# Patient Record
Sex: FEMALE | Race: BLACK OR AFRICAN AMERICAN | NOT HISPANIC OR LATINO | Employment: UNEMPLOYED | URBAN - METROPOLITAN AREA
[De-identification: names, ages, dates, MRNs, and addresses within clinical notes are randomized per-mention and may not be internally consistent; named-entity substitution may affect disease eponyms.]

---

## 2023-09-14 DIAGNOSIS — Z3A.28 28 WEEKS GESTATION OF PREGNANCY: Primary | ICD-10-CM

## 2023-09-14 DIAGNOSIS — Z34.93 PRENATAL CARE IN THIRD TRIMESTER: ICD-10-CM

## 2023-09-25 ENCOUNTER — TELEPHONE (OUTPATIENT)
Dept: OBSTETRICS AND GYNECOLOGY | Facility: CLINIC | Age: 28
End: 2023-09-25

## 2023-09-25 ENCOUNTER — INITIAL PRENATAL (OUTPATIENT)
Dept: OBSTETRICS AND GYNECOLOGY | Facility: CLINIC | Age: 28
End: 2023-09-25

## 2023-09-25 ENCOUNTER — DOCUMENTATION (OUTPATIENT)
Dept: OBSTETRICS AND GYNECOLOGY | Facility: CLINIC | Age: 28
End: 2023-09-25

## 2023-09-25 VITALS
BODY MASS INDEX: 22.11 KG/M2 | HEIGHT: 72 IN | WEIGHT: 163.2 LBS | SYSTOLIC BLOOD PRESSURE: 110 MMHG | DIASTOLIC BLOOD PRESSURE: 50 MMHG

## 2023-09-25 DIAGNOSIS — Z3A.28 28 WEEKS GESTATION OF PREGNANCY: ICD-10-CM

## 2023-09-25 DIAGNOSIS — Z87.410 HISTORY OF CERVICAL DYSPLASIA: ICD-10-CM

## 2023-09-25 DIAGNOSIS — O09.32 PRENATAL CARE INSUFFICIENT, SECOND TRIMESTER: Primary | ICD-10-CM

## 2023-09-25 DIAGNOSIS — O35.09X0 SINGLETON PREGNANCY COMPLICATED BY CEREBRAL VENTRICULOMEGALY OF FETUS: ICD-10-CM

## 2023-09-25 PROBLEM — Z34.90 PREGNANCY: Status: ACTIVE | Noted: 2023-09-25

## 2023-09-25 LAB
EXPIRATION DATE: 0
GLUCOSE UR STRIP-MCNC: NEGATIVE MG/DL
Lab: 0
PROT UR STRIP-MCNC: NEGATIVE MG/DL

## 2023-09-25 RX ORDER — PRENATAL VIT NO.126/IRON/FOLIC 28MG-0.8MG
TABLET ORAL DAILY
COMMUNITY

## 2023-09-25 NOTE — PROGRESS NOTES
Faxed signed medical record request to [facility] via RightFax; confirmed it went through.

## 2023-09-25 NOTE — ASSESSMENT & PLAN NOTE
Reviewed borderline/mild ventriculomegaly of the third ventricle.  TORCH labs ordered.  Referral to PDC.  Overall, no other suspicious findings.

## 2023-09-25 NOTE — PROGRESS NOTES
Initial ob visit     CC- Here for care of pregnancy        Evangelist Youngblood is a 28 y.o. female, , who presents for her first obstetrical visit.  Her last LMP was No LMP recorded. Patient is pregnant.. Her MILY is 2023, by Ultrasound. Current GA is 28w1d.     Initial positive test date : 2023 unsure of date, UPT        Her periods are: every 4 weeks  Prior obstetric issues: none  Patient's past medical history is significant for:  None .  Family history of genetic issues (includes FOB): None  Prior infections concerning in pregnancy (Rash, fever in last 2 weeks): No  Varicella Hx - vaccinated  Prior testing for Cystic Fibrosis Carrier or Sickle Cell Trait- None  Prepregnancy BMI - Body mass index is 21.53 kg/m².  History of STD: no  Hx of HSV for patient or partner: no  Ultrasound Today: yes, borderline/mild dilatation of third ventricle. Suboptimal spine views    OB History    Para Term  AB Living   3 1 1 0 1 1   SAB IAB Ectopic Molar Multiple Live Births   1 0 0 0 0 1      # Outcome Date GA Lbr Percy/2nd Weight Sex Delivery Anes PTL Lv   3 Current            2 SAB  13w0d    SAB      1 Term      Vag-Spont   STARR       Additional Pertinent History   Last Pap : Last pap in CT, unknown date  Result: unknown  HPV: unknown  [place] with bx      Last Completed Pap Smear       This patient has no relevant Health Maintenance data.          History of abnormal Pap smear: no  Family history of uterine, colon, breast, or ovarian cancer: no  Feelings of Anxiety or Depression: no  Tobacco Usage?: No   Alcohol/Drug Use?: NO  Over the age of 35 at delivery: no  Desires Genetic Screening: NIPT Only  [facility] Status[facility]    Current Outpatient Medications:     prenatal vitamin (prenatal, CLASSIC, vitamin) tablet, Take  by mouth Daily., Disp: , Rfl:      Past Medical History:   Diagnosis Date    Miscarriage at 13 weeks gestation 2021        History reviewed. No pertinent surgical  "history.    Review of Systems   Review of Systems    Patient Reports:  occasional sharp pain in right hip that makes it difficult to walk at times  , it does not radiate to other areas  Patient Denies: Nausea, Nausea and vomiting, Spotting, Heavy bleeding, Cramping, and Fatigue  All systems reviewed and otherwise normal.    I have reviewed and agree with the HPI, ROS, and historical information as entered above. Kris Amaro MD     /50   Ht 185.4 cm (73\")   Wt 74 kg (163 lb 3.2 oz)   BMI 21.53 kg/m²     The additional following portions of the patient's history were reviewed and updated as appropriate: allergies, current medications, past family history, past medical history, past social history, past surgical history, and problem list.    Physical Exam  General:  well developed; well nourished  no acute distress       Chest/Respiratory: No labored breathing, normal respiratory effort, normal appearance, no respiratory noises noted   Heart:  normal rate, regular rhythm,  no murmurs, rubs, or gallops   Thyroid: not examined   Breasts:  Not performed.   Abdomen: soft, non-tender; no masses  no umbilical or inguinal hernias are present  no hepato-splenomegaly   Pelvis: External genitalia:  normal appearance of the external genitalia including Bartholin's and [place]'s glands.  Vaginal:  normal pink mucosa without prolapse or lesions.  Cervix:  normal appearance.  Uterus:  normal size, shape and consistency.  Adnexa: normal findings        Assessment and Plan    Problem List Items Addressed This Visit          Genitourinary and Reproductive     History of cervical dysplasia    Overview     Previously told she needed colposcopy, never performed.         Current Assessment & Plan     Pap performed today.            Gravid and     Pregnancy    Overview     [ ] Initial PN Labs: Normal / Abnormal (Blood Type/ Rubella)   [ ] Aneuploidy Screening: Normal / Abnormal  [ ] Anatomy Ultrasound: Normal/ Abnormal "   [ ] 24-28 week labs: Normal / Abnormal  [ ] Flu Shot: Given / Declined / Not Applicable   [x] TDAP: Given 2023   [ ] Rhogam: Given / Declined / Not Applicable   [ ] GBS: Positive / Negative  [ ] 36 week GC/CT: Positive / Negative              Current Assessment & Plan     New OB labs today, along with NIPT.  1 hour Glucola today.         Relevant Orders    Obstetric Panel    Urine Culture - Urine, Urine, Clean Catch    Urine Drug Screen - Urine, Clean Catch    Chlamydia trachomatis, Neisseria gonorrhoeae, PCR w/ confirmation - Urine, Urine, Clean Catch    Varicella Zoster Antibody, IgG    HIV-1 / O / 2 Ag / Antibody    TSH Rfx On Abnormal To Free T4    Gestational Screen 1 Hr ([facility])    [facility] [place]    LIQUID-BASED PAP SMEAR, [facility] ([facility])    CMV IgG IgM    Toxoplasma Antibodies IgG / IgM    Parvovirus B19 Antibody, IgG & IgM    POC Protein, Urine, Qualitative, Dipstick (Completed)    POC Glucose, Urine, Qualitative, Dipstick (Completed)    [id number] PLUS Core+SCA+ESS - Blood,    Hemoglobinopathy Fractionation Dare    Tdap Vaccine => 8yo IM (BOOSTRIX) (Completed)    [place] pregnancy complicated by cerebral ventriculomegaly of fetus    Current Assessment & Plan     Reviewed borderline/mild ventriculomegaly of the third ventricle.  TORCH labs ordered.  Referral to PDC.  Overall, no other suspicious findings.         Relevant Orders    [facility] [place]    CMV IgG IgM    Toxoplasma Antibodies IgG / IgM    Parvovirus B19 Antibody, IgG & IgM     Other Visit Diagnoses       Prenatal care insufficient, second trimester    -  Primary            Pregnancy at 28w1d  Reviewed routine prenatal care with the office and educational materials given  Lab(s) Ordered  Discussed options for genetic testing including first trimester nuchal translucency screen, genetic disease carrier testing, quadruple screen, and NIPT  Nausea/Vomiting - she does not desire medications at  this time.  Discussed conservative ways to help with nausea.  Patient is on Prenatal vitamins  Referral to PDC Ordered  Return in about 4 weeks (around 10/23/2023) for Prenatal Care.      Kris Amaro MD  09/25/2023

## 2023-09-25 NOTE — TELEPHONE ENCOUNTER
Attempted to retrieve lab results from patient's ER visit at 18w4d. Medical record request was sent 9/21, but no records received by this office yet. [facility] patient was never seen by their office. This RN referred to [facility] at [phone]. Spoke with Dia. She checked medical record requests and did not see a request in the system for this patient. Fax number given by Dia [phone].

## 2023-09-26 LAB
ABO GROUP BLD: ABNORMAL
AMPHETAMINES UR QL SCN: NEGATIVE NG/ML
BARBITURATES UR QL SCN: NEGATIVE NG/ML
BASOPHILS # BLD AUTO: 0 X10E3/UL (ref 0–0.2)
BASOPHILS NFR BLD AUTO: 0 %
BENZODIAZ UR QL SCN: NEGATIVE NG/ML
BLD GP AB SCN SERPL QL: NEGATIVE
BZE UR QL SCN: NEGATIVE NG/ML
CANNABINOIDS UR QL SCN: NEGATIVE NG/ML
CMV IGG SERPL IA-ACNC: 3.2 U/ML (ref 0–0.59)
CMV IGM SERPL IA-ACNC: <30 AU/ML (ref 0–29.9)
CREAT UR-MCNC: 149.8 MG/DL (ref 20–300)
EOSINOPHIL # BLD AUTO: 0.3 X10E3/UL (ref 0–0.4)
EOSINOPHIL NFR BLD AUTO: 3 %
ERYTHROCYTE [DISTWIDTH] IN BLOOD BY AUTOMATED COUNT: 12.1 % (ref 11.7–15.4)
HBV SURFACE AG SERPL QL IA: NEGATIVE
HCT VFR BLD AUTO: 30.8 % (ref 34–46.6)
HCV IGG SERPL QL IA: NON REACTIVE
HGB A MFR BLD ELPH: 97.4 % (ref 96.4–98.8)
HGB A2 MFR BLD ELPH: 2.6 % (ref 1.8–3.2)
HGB BLD-MCNC: 10.7 G/DL (ref 11.1–15.9)
HGB F MFR BLD ELPH: 0 % (ref 0–2)
HGB FRACT BLD-IMP: NORMAL
HGB S MFR BLD ELPH: 0 %
HIV 1+2 AB+HIV1 P24 AG SERPL QL IA: NON REACTIVE
IMM GRANULOCYTES # BLD AUTO: 0 X10E3/UL (ref 0–0.1)
IMM GRANULOCYTES NFR BLD AUTO: 0 %
LABORATORY COMMENT REPORT: ABNORMAL
LABORATORY COMMENT REPORT: NORMAL
LYMPHOCYTES # BLD AUTO: 2 X10E3/UL (ref 0.7–3.1)
LYMPHOCYTES NFR BLD AUTO: 26 %
MCH RBC QN AUTO: 31.9 PG (ref 26.6–33)
MCHC RBC AUTO-ENTMCNC: 34.7 G/DL (ref 31.5–35.7)
MCV RBC AUTO: 92 FL (ref 79–97)
METHADONE UR QL SCN: NEGATIVE NG/ML
MONOCYTES # BLD AUTO: 0.6 X10E3/UL (ref 0.1–0.9)
MONOCYTES NFR BLD AUTO: 8 %
NEUTROPHILS # BLD AUTO: 4.8 X10E3/UL (ref 1.4–7)
NEUTROPHILS NFR BLD AUTO: 63 %
OPIATES UR QL SCN: NEGATIVE NG/ML
OXYCODONE+OXYMORPHONE UR QL SCN: NEGATIVE NG/ML
PCP UR QL: NEGATIVE NG/ML
PH UR: 6.3 [PH] (ref 4.5–8.9)
PLATELET # BLD AUTO: 256 X10E3/UL (ref 150–450)
PROPOXYPH UR QL SCN: NEGATIVE NG/ML
RBC # BLD AUTO: 3.35 X10E6/UL (ref 3.77–5.28)
RH BLD: POSITIVE
RPR SER QL: NON REACTIVE
RUBV IGG SERPL IA-ACNC: 10.5 INDEX
T GONDII IGG SERPL IA-ACNC: 18.8 IU/ML (ref 0–7.1)
T GONDII IGM SER IA-ACNC: <3 AU/ML (ref 0–7.9)
TSH SERPL DL<=0.005 MIU/L-ACNC: 1.39 UIU/ML (ref 0.45–4.5)
VZV IGG SER IA-ACNC: 1041 INDEX
WBC # BLD AUTO: 7.7 X10E3/UL (ref 3.4–10.8)

## 2023-09-27 LAB
BACTERIA UR CULT: NO GROWTH
BACTERIA UR CULT: NORMAL
C TRACH RRNA SPEC QL NAA+PROBE: NEGATIVE
GLUCOSE 1H P 50 G GLC PO SERPL-MCNC: 62 MG/DL (ref 70–139)
N GONORRHOEA RRNA SPEC QL NAA+PROBE: NEGATIVE
REF LAB TEST METHOD: NORMAL

## 2023-09-28 LAB
B19V IGG SER IA-ACNC: 0.2 INDEX (ref 0–0.8)
B19V IGM SER IA-ACNC: 0.1 INDEX (ref 0–0.8)
REF LAB TEST METHOD: NORMAL

## 2023-09-29 ENCOUNTER — TELEPHONE (OUTPATIENT)
Dept: OBSTETRICS AND GYNECOLOGY | Facility: CLINIC | Age: 28
End: 2023-09-29

## 2023-10-17 ENCOUNTER — OFFICE VISIT (OUTPATIENT)
Dept: OBSTETRICS AND GYNECOLOGY | Facility: HOSPITAL | Age: 28
End: 2023-10-17
Payer: MEDICAID

## 2023-10-17 ENCOUNTER — HOSPITAL ENCOUNTER (OUTPATIENT)
Dept: WOMENS IMAGING | Facility: HOSPITAL | Age: 28
Discharge: HOME | End: 2023-10-17
Admitting: OBSTETRICS & GYNECOLOGY
Payer: MEDICAID

## 2023-10-17 VITALS — DIASTOLIC BLOOD PRESSURE: 53 MMHG | WEIGHT: 169.2 LBS | BODY MASS INDEX: 22.32 KG/M2 | SYSTOLIC BLOOD PRESSURE: 104 MMHG

## 2023-10-17 DIAGNOSIS — Z3A.28 28 WEEKS GESTATION OF PREGNANCY: ICD-10-CM

## 2023-10-17 DIAGNOSIS — O35.09X0 SINGLETON PREGNANCY COMPLICATED BY CEREBRAL VENTRICULOMEGALY OF FETUS: ICD-10-CM

## 2023-10-17 DIAGNOSIS — Z03.73 FETAL ANOMALY SUSPECTED BUT NOT FOUND: Primary | ICD-10-CM

## 2023-10-17 PROCEDURE — 76811 OB US DETAILED SNGL FETUS: CPT

## 2023-10-17 NOTE — PROGRESS NOTES
"    Maternal/Fetal Medicine Consult Note   Date: 10/17/2023  Name: Evangelist Youngblood    : 1995     MRN: [id number]     Referring Provider: Kris Amaro MD    Chief Complaint  dilated 3rd ventricle , late PNC    Subjective     History of Present Illness:  Evangelist Youngblood is a 28 y.o.  31w2d who presents today for consultation secondary to concern for dilated third ventricle.    Patient states she feels well today.  Denies contractions, leaking of fluid, vaginal bleeding.  Having normal fetal movement.    MILY: Estimated Date of Delivery: 23     ROS:   Otherwise Noted in HPI    Past Medical History:   Diagnosis Date    History of miscarriage 2021    13 weeks      History reviewed. No pertinent surgical history.   OB History          3    Para   1    Term   0       1    AB   1    Living   1         SAB   1    IAB   0    Ectopic   0    Molar   0    Multiple   0    Live Births   1          Obstetric Comments   Fob #1 - Pregnancy #1, #2, and #3                Current Outpatient Medications:     prenatal vitamin (prenatal, CLASSIC, vitamin) tablet, Take  by mouth Daily., Disp: , Rfl:     Objective     Vital Signs  /53   Wt 76.7 kg (169 lb 3.2 oz)   Estimated body mass index is 22.32 kg/m² as calculated from the following:    Height as of 23: 185.4 cm (73\").    Weight as of this encounter: 76.7 kg (169 lb 3.2 oz).    Ultrasound Impression:   See Viewpoint     Assessment and Plan     Evangelist Youngblood is a 28 y.o.  31w2d who presents today for consultation secondary to concern for dilated third ventricle.    Diagnoses and all orders for this visit:    1. Fetal anomaly suspected but not found (Primary)  Assessment & Plan:  Patient seen for consultation secondary to concern for dilated third ventricle.  Today's ultrasound shows normal intracranial anatomy including normal third ventricle size.  All other anatomy appears normal.  No further follow-up ultrasounds " are scheduled here at this time.           Follow Up  Follow-up as clinically indicated    I spent 15 minutes caring for the patient on the day of service. This included: obtaining or reviewing a separately obtained medical history, reviewing patient records, performing a medically appropriate exam and/or evaluation, counseling or educating the patient/family/caregiver, ordering medications, labs, and/or procedures and documenting such in the medical record. This does not include time spent on review and interpretation of other tests such as fetal ultrasound or the performance of other procedures such as amniocentesis or CVS.      Bryan Hancock MD, FACOG  Maternal Fetal Medicine, [facility] [place]

## 2023-10-17 NOTE — ASSESSMENT & PLAN NOTE
Patient seen for consultation secondary to concern for dilated third ventricle.  Today's ultrasound shows normal intracranial anatomy including normal third ventricle size.  All other anatomy appears normal.  No further follow-up ultrasounds are scheduled here at this time.

## 2023-10-18 PROBLEM — O35.09X0: Status: RESOLVED | Noted: 2023-09-25 | Resolved: 2023-10-18

## 2023-11-06 ENCOUNTER — ROUTINE PRENATAL (OUTPATIENT)
Dept: OBSTETRICS AND GYNECOLOGY | Facility: CLINIC | Age: 28
End: 2023-11-06

## 2023-11-06 VITALS — DIASTOLIC BLOOD PRESSURE: 64 MMHG | BODY MASS INDEX: 22.69 KG/M2 | SYSTOLIC BLOOD PRESSURE: 112 MMHG | WEIGHT: 172 LBS

## 2023-11-06 DIAGNOSIS — Z34.93 PRENATAL CARE IN THIRD TRIMESTER: Primary | ICD-10-CM

## 2023-11-06 LAB
GLUCOSE UR STRIP-MCNC: NEGATIVE MG/DL
PROT UR STRIP-MCNC: NEGATIVE MG/DL

## 2023-11-06 RX ORDER — ACETAMINOPHEN 500 MG
500 TABLET ORAL EVERY 6 HOURS PRN
COMMUNITY

## 2023-11-06 NOTE — PROGRESS NOTES
OB FOLLOW UP  CC- Here for care of pregnancy        Evangelist Youngblood is a 28 y.o.  34w1d patient being seen today for her obstetrical follow up visit. Patient reports intermittent BH contractions.    Her prenatal care is complicated by (and status) :   Patient Active Problem List   Diagnosis    Pregnancy    History of cervical dysplasia    Fetal anomaly suspected but not found       Flu Status: Already given in current flu season  Ultrasound Today: No  Non Stress Test: No.    ROS -   Patient Reports : Contractions  Patient Denies: Loss of Fluid, Vaginal Spotting, Vision Changes, Headaches, Nausea , Vomiting , and Epigastric pain  Fetal Movement : normal  All other systems reviewed and are negative.       The additional following portions of the patient's history were reviewed and updated as appropriate: allergies, current medications, past family history, past medical history, past social history, past surgical history, and problem list.    I have reviewed and agree with the HPI, ROS, and historical information as entered above. Kris Amaro MD     /64   Wt 78 kg (172 lb)   BMI 22.69 kg/m²       EXAM:     Prenatal Vitals  BP: 112/64  Weight: 78 kg (172 lb)                   Urine Glucose Read-only: Negative  Urine Protein Read-only: Negative           Assessment and Plan    Problem List Items Addressed This Visit    None  Visit Diagnoses       Prenatal care in third trimester    -  Primary    Relevant Orders    POC Urinalysis Dipstick (Completed)            Pregnancy at 34w1d  Fetal status reassuring.   Activity and Exercise discussed.  TDAP and Flu complete  Fetal movement/PTL or Labor precautions  GBS next visit  No follow-ups on file.    Kris Amaro MD  2023

## 2023-11-20 ENCOUNTER — LAB (OUTPATIENT)
Dept: LAB | Facility: HOSPITAL | Age: 28
End: 2023-11-20

## 2023-11-20 ENCOUNTER — ROUTINE PRENATAL (OUTPATIENT)
Dept: OBSTETRICS AND GYNECOLOGY | Facility: CLINIC | Age: 28
End: 2023-11-20

## 2023-11-20 VITALS — DIASTOLIC BLOOD PRESSURE: 60 MMHG | BODY MASS INDEX: 22.75 KG/M2 | WEIGHT: 172.4 LBS | SYSTOLIC BLOOD PRESSURE: 104 MMHG

## 2023-11-20 DIAGNOSIS — Z3A.36 36 WEEKS GESTATION OF PREGNANCY: ICD-10-CM

## 2023-11-20 DIAGNOSIS — Z87.59 HISTORY OF THIRD DEGREE PERINEAL LACERATION: ICD-10-CM

## 2023-11-20 DIAGNOSIS — Z3A.36 36 WEEKS GESTATION OF PREGNANCY: Primary | ICD-10-CM

## 2023-11-20 DIAGNOSIS — Z34.93 PRENATAL CARE IN THIRD TRIMESTER: ICD-10-CM

## 2023-11-20 LAB
EXPIRATION DATE: 0
GLUCOSE UR STRIP-MCNC: NEGATIVE MG/DL
Lab: 0
PROT UR STRIP-MCNC: NEGATIVE MG/DL

## 2023-11-20 PROCEDURE — 87081 CULTURE SCREEN ONLY: CPT

## 2023-11-20 NOTE — PROGRESS NOTES
OB FOLLOW UP  CC- Here for care of pregnancy        Evangelist Youngblood is a 28 y.o.  36w1d patient being seen today for her obstetrical follow up visit. Patient reports increased [place] that have become more uncomfortable. Occasional heartburn noted (resolves on its own). Previous pregnancy history corrected; patient pulled records from her [facility].    Her prenatal care is complicated by (and status):  Patient Active Problem List   Diagnosis    Pregnancy    History of cervical dysplasia    Fetal anomaly suspected but not found    History of third degree perineal laceration       GBS Status: Done Today. She is not allergic to PCN.    No Known Allergies       Flu Status:  received this season  Her Delivery Plan is:  open to discussion     US today: no  Non Stress Test: No.    ROS -   Patient Reports:  see above  Patient Denies: Loss of Fluid, Vaginal Spotting, Vision Changes, Headaches, Nausea , and Vomiting   Fetal Movement : normal  All other systems reviewed and are negative.       The additional following portions of the patient's history were reviewed and updated as appropriate: allergies, current medications, past family history, past medical history, past social history, past surgical history, and problem list.    I have reviewed and agree with the HPI, ROS, and historical information as entered above. Kris Amaro MD       EXAM:     Prenatal Vitals  BP: 104/60  Weight: 78.2 kg (172 lb 6.4 oz)   Fetal Heart Rate: 155       Dilation/Effacement/Station  Dilation: Closed  Effacement (%): 0  Station: -5      Urine Glucose Read-only: Negative  Urine Protein Read-only: Negative           Assessment and Plan    Problem List Items Addressed This Visit       Pregnancy - Primary    Overview     [ ] Initial PN Labs: Normal / Abnormal (Blood Type/ Rubella)   [ ] Aneuploidy Screening: Normal / Abnormal  [ ] Anatomy Ultrasound: Normal/ Abnormal   [ ] 24-28 week labs: Normal / Abnormal  [ ]  Flu Shot: Given / Declined / Not Applicable   [x] TDAP: Given 9/25/2023   [ ] Rhogam: Given / Declined / Not Applicable   [ ] GBS: Positive / Negative  [ ] 36 week GC/CT: Positive / Negative              Relevant Orders    POC Glucose, Urine, Qualitative, Dipstick (Completed)    POC Protein, Urine, Qualitative, Dipstick (Completed)    Chlamydia trachomatis, Neisseria gonorrhoeae, PCR - , Vagina    Group B Streptococcus Culture - Swab, Vaginal/Rectum    US Ob Follow Up Transabdominal Approach    History of third degree perineal laceration    Relevant Orders    US Ob Follow Up Transabdominal Approach     Other Visit Diagnoses       Prenatal care in third trimester        Relevant Orders    POC Glucose, Urine, Qualitative, Dipstick (Completed)    POC Protein, Urine, Qualitative, Dipstick (Completed)    Chlamydia trachomatis, Neisseria gonorrhoeae, PCR - , Vagina    Group B Streptococcus Culture - Swab, Vaginal/Rectum            Pregnancy at 36w1d  Unable to palpate presenting part. With history of 3rd degree and no presenting part, will obtain sono next week  Fetal status reassuring.   U/S ordered at follow up  Reviewed Pre-eclampsia signs/symptoms  Discussed IOL options with patient. Pt. desires IOL at 39 weeks.   Discussed IOL options with patient. Pt. desires IOL after 40 weeks.   Discussed options for IOL. Patient desires spontaneous labor. Would like to postpone an IOL unless medically indicated.   Delivery options reviewed with patient  Signs of labor reviewed  Kick counts reviewed  Activity and Exercise discussed.  Return in about 1 week (around 11/27/2023) for Prenatal Care with SONO.    Kris Amaro MD  11/20/2023

## 2023-11-22 LAB
C TRACH RRNA SPEC QL NAA+PROBE: NEGATIVE
N GONORRHOEA RRNA SPEC QL NAA+PROBE: NEGATIVE

## 2023-11-23 LAB — BACTERIA SPEC AEROBE CULT: NORMAL

## 2023-11-29 ENCOUNTER — TELEPHONE (OUTPATIENT)
Dept: OBSTETRICS AND GYNECOLOGY | Facility: CLINIC | Age: 28
End: 2023-11-29

## 2023-11-29 NOTE — TELEPHONE ENCOUNTER
Called patient and informed her of induction date/time change to 12/11 at 5:00pm. Verbalized understanding.

## 2023-11-30 ENCOUNTER — ROUTINE PRENATAL (OUTPATIENT)
Dept: OBSTETRICS AND GYNECOLOGY | Facility: CLINIC | Age: 28
End: 2023-11-30
Payer: MEDICAID

## 2023-11-30 VITALS — BODY MASS INDEX: 23.35 KG/M2 | DIASTOLIC BLOOD PRESSURE: 58 MMHG | WEIGHT: 177 LBS | SYSTOLIC BLOOD PRESSURE: 100 MMHG

## 2023-11-30 DIAGNOSIS — Z34.93 PRENATAL CARE IN THIRD TRIMESTER: Primary | ICD-10-CM

## 2023-11-30 LAB
GLUCOSE UR STRIP-MCNC: NEGATIVE MG/DL
PROT UR STRIP-MCNC: NEGATIVE MG/DL

## 2023-11-30 NOTE — PROGRESS NOTES
OB FOLLOW UP  CC- Here for care of pregnancy        Evangelist Youngblood is a 28 y.o.  37w4d patient being seen today for her obstetrical follow up visit. Patient reports intermittent jon peterson contractions.     Her prenatal care is complicated by (and status) :   Patient Active Problem List   Diagnosis    Pregnancy    History of cervical dysplasia    Fetal anomaly suspected but not found    History of third degree perineal laceration       GBS Status:   Group B Strep Culture   Date Value Ref Range Status   2023 No Group B Streptococcus isolated  Final         No Known Allergies       Flu Status: Already given in current flu season  Her Delivery Plan is:  desires spontaneous labor   IOL scheduled 23 @ 1700  US today: yes. Cephalic presentation, anterior placenta, EFW 2935 gms, normal amount of AF  Non Stress Test: No.    ROS -   Patient Denies: Loss of Fluid, Vaginal Spotting, Vision Changes, Headaches, Nausea , Vomiting , and Epigastric pain  Fetal Movement : normal  All other systems reviewed and are negative.       The additional following portions of the patient's history were reviewed and updated as appropriate: allergies, current medications, past family history, past medical history, past social history, past surgical history, and problem list.    I have reviewed and agree with the HPI, ROS, and historical information as entered above. Miri Soni, APRN        EXAM:     Prenatal Vitals  BP: 100/58  Weight: 80.3 kg (177 lb)   Fetal Heart Rate: 143              Urine Glucose Read-only: Negative  Urine Protein Read-only: Negative           Assessment and Plan    Problem List Items Addressed This Visit    None  Visit Diagnoses       Prenatal care in third trimester    -  Primary    Relevant Orders    POC Urinalysis Dipstick (Completed)            Pregnancy at 37w4d  Fetal status reassuring.   US 32% today  Reviewed upcoming IOL.  She v/u but hopes she will have spontaneous  labor.  Delivery options reviewed with patient  Signs of labor reviewed  Kick counts reviewed  Activity and Exercise discussed.  Return in about 1 week (around 12/7/2023).    Miri Soni, APRN  11/30/2023